# Patient Record
Sex: MALE | URBAN - METROPOLITAN AREA
[De-identification: names, ages, dates, MRNs, and addresses within clinical notes are randomized per-mention and may not be internally consistent; named-entity substitution may affect disease eponyms.]

---

## 2021-05-06 ENCOUNTER — OFFICE VISIT (OUTPATIENT)
Dept: URBAN - METROPOLITAN AREA CLINIC 35 | Facility: CLINIC | Age: 46
End: 2021-05-06
Payer: COMMERCIAL

## 2021-05-06 DIAGNOSIS — H35.363 BILATERAL DRUSEN OF MACULAS: Primary | ICD-10-CM

## 2021-05-06 PROCEDURE — 92134 CPTRZ OPH DX IMG PST SGM RTA: CPT | Performed by: OPHTHALMOLOGY

## 2021-05-06 PROCEDURE — 99203 OFFICE O/P NEW LOW 30 MIN: CPT | Performed by: OPHTHALMOLOGY

## 2021-05-06 ASSESSMENT — INTRAOCULAR PRESSURE
OD: 17
OS: 13

## 2021-05-06 NOTE — IMPRESSION/PLAN
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 Plan: Lisa Muller, thanks for referring this nice man. Your notes were reviewed. I agree with you: he has small drusen in the macula OU. OCT confirms no IRF/SRF OU. We discussed the findings and natural history. I recommend observation with daily [employer] grid monitoring. He will call us with any new visual changes. Otherwise, he will f/u with your excellent care. thanks Lisa Muller RTC PRN